# Patient Record
Sex: FEMALE | Race: WHITE | NOT HISPANIC OR LATINO | Employment: OTHER | URBAN - METROPOLITAN AREA
[De-identification: names, ages, dates, MRNs, and addresses within clinical notes are randomized per-mention and may not be internally consistent; named-entity substitution may affect disease eponyms.]

---

## 2023-08-09 ENCOUNTER — TELEPHONE (OUTPATIENT)
Dept: PRIMARY CARE | Facility: CLINIC | Age: 83
End: 2023-08-09
Payer: MEDICARE

## 2023-08-09 NOTE — TELEPHONE ENCOUNTER
The last time Dr. De La Torre saw pt was 10/3/2022. I faxed that last office note to th fax number below.

## 2023-08-09 NOTE — TELEPHONE ENCOUNTER
I had faxed pt's last office note to [facility] atten: Mary Celis per pt family request. I received a call from Mary Celis (ph: [phone]) who works at [facility]. She did not know why we faxed that paperwork to her. Pt is not there and she has not been accepted by them. Mary did not know who this pt is.

## 2023-08-09 NOTE — TELEPHONE ENCOUNTER
Pt daughter called and stated they where never able to get Myah into a nursing home but now are looking to bring her to [facility]. They are requesting medical records be sent to F: [phone] Attn: Mary Celis. Please advise.

## 2023-10-10 ENCOUNTER — OFFICE VISIT (OUTPATIENT)
Dept: PRIMARY CARE | Facility: CLINIC | Age: 83
End: 2023-10-10
Payer: MEDICARE

## 2023-10-10 VITALS
OXYGEN SATURATION: 96 % | RESPIRATION RATE: 20 BRPM | TEMPERATURE: 98 F | HEART RATE: 86 BPM | BODY MASS INDEX: 22.45 KG/M2 | WEIGHT: 97 LBS | HEIGHT: 55 IN | DIASTOLIC BLOOD PRESSURE: 62 MMHG | SYSTOLIC BLOOD PRESSURE: 98 MMHG

## 2023-10-10 DIAGNOSIS — Z23 NEED FOR VACCINATION: ICD-10-CM

## 2023-10-10 DIAGNOSIS — F03.918 DEMENTIA WITH BEHAVIORAL DISTURBANCE (MULTI): ICD-10-CM

## 2023-10-10 PROBLEM — R03.0 ELEVATED BP WITHOUT DIAGNOSIS OF HYPERTENSION: Status: ACTIVE | Noted: 2023-10-10

## 2023-10-10 PROBLEM — R79.89 ABNORMAL TSH: Status: ACTIVE | Noted: 2023-10-10

## 2023-10-10 PROBLEM — E53.8 B12 DEFICIENCY: Status: ACTIVE | Noted: 2023-10-10

## 2023-10-10 PROBLEM — E78.2 MIXED HYPERLIPIDEMIA: Status: ACTIVE | Noted: 2023-10-10

## 2023-10-10 PROBLEM — M85.80 OSTEOPENIA: Status: ACTIVE | Noted: 2023-10-10

## 2023-10-10 PROBLEM — E78.5 HYPERLIPIDEMIA: Status: ACTIVE | Noted: 2023-10-10

## 2023-10-10 PROBLEM — R63.4 WEIGHT LOSS, ABNORMAL: Status: ACTIVE | Noted: 2023-10-10

## 2023-10-10 PROBLEM — K57.32 DIVERTICULITIS OF COLON: Status: ACTIVE | Noted: 2023-10-10

## 2023-10-10 PROBLEM — R41.3 MEMORY PROBLEM: Status: ACTIVE | Noted: 2023-10-10

## 2023-10-10 PROBLEM — R03.0 ELEVATED BP WITHOUT DIAGNOSIS OF HYPERTENSION: Status: RESOLVED | Noted: 2023-10-10 | Resolved: 2023-10-10

## 2023-10-10 PROCEDURE — 1159F MED LIST DOCD IN RCRD: CPT | Performed by: FAMILY MEDICINE

## 2023-10-10 PROCEDURE — 99214 OFFICE O/P EST MOD 30 MIN: CPT | Performed by: FAMILY MEDICINE

## 2023-10-10 PROCEDURE — 1160F RVW MEDS BY RX/DR IN RCRD: CPT | Performed by: FAMILY MEDICINE

## 2023-10-10 PROCEDURE — G0008 ADMIN INFLUENZA VIRUS VAC: HCPCS | Performed by: FAMILY MEDICINE

## 2023-10-10 PROCEDURE — 1036F TOBACCO NON-USER: CPT | Performed by: FAMILY MEDICINE

## 2023-10-10 PROCEDURE — 90662 IIV NO PRSV INCREASED AG IM: CPT | Performed by: FAMILY MEDICINE

## 2023-10-10 RX ORDER — CHOLECALCIFEROL (VITAMIN D3) 25 MCG
1 TABLET,CHEWABLE ORAL DAILY
COMMUNITY
Start: 2022-10-03

## 2023-10-10 RX ORDER — ASPIRIN 81 MG/1
81 TABLET ORAL DAILY
COMMUNITY

## 2023-10-10 RX ORDER — DIPHENHYDRAMINE HCL 25 MG
25 TABLET ORAL NIGHTLY PRN
COMMUNITY

## 2023-10-10 ASSESSMENT — ENCOUNTER SYMPTOMS
OCCASIONAL FEELINGS OF UNSTEADINESS: 1
DEPRESSION: 0
LOSS OF SENSATION IN FEET: 0

## 2023-10-10 NOTE — PROGRESS NOTES
"Myah Franco is a 82 y.o. female here today for   Chief Complaint   Patient presents with    Medical Advice/Question   Pt is moving into nursing home, hoping for [facility] as soon as bed is available     HPI   [facility] needs forms completed soon.  Has still been living at home with her daughter and .  They were looking at putting her in Alzheimer unit last year but they never did.  [facility] has an Alzheimer's unit.  Her Alzheimers has worsened over time with increased confusion.  Poor oral intake - weight stable since last year.  She has not had any falls.  She needs constant supervision from her daughter or her  to make sure that she is safe.  It has become too much for them to continue care.  The family has decided to put her in [facility].    PMH:  Alzheimer's dementia, B12 deficiency, insomnia - Benadryl helps so she does not wander at night.  Incontinent of urine and stool - adult diaper.  No special diet.      Surg Hx:  Hysterectomy, right ankle surgery.    Soc Hx:  No tobacco, No ETOH.  7 children.  .          Current Outpatient Medications:     aspirin 81 mg EC tablet, Take 1 tablet (81 mg) by mouth once daily., Disp: , Rfl:     cyanocobalamin, vitamin B-12, 1,000 mcg capsule, Take 1 capsule by mouth once daily., Disp: , Rfl:     diphenhydrAMINE (Sominex) 25 mg tablet, Take 1 tablet (25 mg) by mouth as needed at bedtime for sleep., Disp: , Rfl:     Patient Active Problem List   Diagnosis    Abnormal TSH    B12 deficiency    Dementia with behavioral disturbance (CMS/HCC)    Diverticulitis of colon    Mixed hyperlipidemia    Memory problem    Osteopenia    Weight loss, abnormal         No results found for this or any previous visit (from the past 672 hour(s)).     Objective    Visit Vitals  BP 98/62   Pulse 86   Temp 36.7 °C (98 °F)   Resp 20   Ht 1.321 m (4' 4\")   Wt (!) 44 kg (97 lb)   SpO2 96%   BMI 25.22 kg/m²     Body mass index is 25.22 kg/m².     Physical Exam   General " - Not in acute distress and cooperative.  Build & Nutrition - Well developed  Posture -kyphotic.  Gait - Normal  Mental Status - alert but not oriented to person place or time.    Head - Normocephalic    Neck - Thyroid normal size    Eyes - Bilateral - Sclera clear and lids pink without edema or mass.      Skin - Warm and dry with no rashes on visible skin    Lungs - Clear to auscultation and normal breathing effort    Cardiovascular - RRR and no murmurs, rubs or thrill.    Peripheral Vascular - Bilateral - no edema present    Neuropsychiatric - normal mood and affect        Assessment    1. Need for vaccination  Flu vaccine, quadrivalent, high-dose, preservative free, age 65y+ (FLUZONE)      2. Dementia with behavioral disturbance (CMS/HCC)     The family has decided to place her in a dementia unit and I think this is a very reasonable decision because of her worsening dementia and need for constant monitoring and supervision.  Her daughter will get the physical forms and bring them to the office so I can complete them and send them to [facility].  The patient is not on any chronic medications and has no other specific medical needs.  She will receive a flu shot today.  I plan to turn over her care to the house physician at her new extended care facility and I discussed this with her daughter to which she agrees.

## 2023-11-28 ENCOUNTER — LAB REQUISITION (OUTPATIENT)
Dept: LAB | Facility: HOSPITAL | Age: 83
End: 2023-11-28
Payer: MEDICARE

## 2023-11-28 DIAGNOSIS — R41.82 ALTERED MENTAL STATUS, UNSPECIFIED: ICD-10-CM

## 2023-11-28 LAB
BASOPHILS # BLD AUTO: 0.02 X10*3/UL (ref 0–0.1)
BASOPHILS NFR BLD AUTO: 0.3 %
EOSINOPHIL # BLD AUTO: 0.04 X10*3/UL (ref 0–0.4)
EOSINOPHIL NFR BLD AUTO: 0.7 %
ERYTHROCYTE [DISTWIDTH] IN BLOOD BY AUTOMATED COUNT: 12.7 % (ref 11.5–14.5)
HCT VFR BLD AUTO: 42.9 % (ref 36–46)
HGB BLD-MCNC: 13.8 G/DL (ref 12–16)
IMM GRANULOCYTES # BLD AUTO: 0.04 X10*3/UL (ref 0–0.5)
IMM GRANULOCYTES NFR BLD AUTO: 0.7 % (ref 0–0.9)
LYMPHOCYTES # BLD AUTO: 0.86 X10*3/UL (ref 0.8–3)
LYMPHOCYTES NFR BLD AUTO: 14.6 %
MCH RBC QN AUTO: 30.6 PG (ref 26–34)
MCHC RBC AUTO-ENTMCNC: 32.2 G/DL (ref 32–36)
MCV RBC AUTO: 95 FL (ref 80–100)
MONOCYTES # BLD AUTO: 0.35 X10*3/UL (ref 0.05–0.8)
MONOCYTES NFR BLD AUTO: 5.9 %
NEUTROPHILS # BLD AUTO: 4.59 X10*3/UL (ref 1.6–5.5)
NEUTROPHILS NFR BLD AUTO: 77.8 %
NRBC BLD-RTO: 0 /100 WBCS (ref 0–0)
PLATELET # BLD AUTO: 374 X10*3/UL (ref 150–450)
RBC # BLD AUTO: 4.51 X10*6/UL (ref 4–5.2)
WBC # BLD AUTO: 5.9 X10*3/UL (ref 4.4–11.3)

## 2023-11-28 PROCEDURE — 85025 COMPLETE CBC W/AUTO DIFF WBC: CPT

## 2023-12-01 ENCOUNTER — LAB REQUISITION (OUTPATIENT)
Dept: LAB | Facility: HOSPITAL | Age: 83
End: 2023-12-01
Payer: MEDICARE

## 2023-12-01 DIAGNOSIS — E78.5 HYPERLIPIDEMIA, UNSPECIFIED: ICD-10-CM

## 2023-12-01 DIAGNOSIS — R79.89 OTHER SPECIFIED ABNORMAL FINDINGS OF BLOOD CHEMISTRY: ICD-10-CM

## 2023-12-01 LAB
ANION GAP SERPL CALC-SCNC: 10 MMOL/L (ref 10–20)
BUN SERPL-MCNC: 16 MG/DL (ref 6–23)
CALCIUM SERPL-MCNC: 8.8 MG/DL (ref 8.6–10.3)
CHLORIDE SERPL-SCNC: 100 MMOL/L (ref 98–107)
CO2 SERPL-SCNC: 35 MMOL/L (ref 21–32)
CREAT SERPL-MCNC: 0.43 MG/DL (ref 0.5–1.05)
GFR SERPL CREATININE-BSD FRML MDRD: >90 ML/MIN/1.73M*2
GLUCOSE SERPL-MCNC: 88 MG/DL (ref 74–99)
POTASSIUM SERPL-SCNC: 3.4 MMOL/L (ref 3.5–5.3)
SODIUM SERPL-SCNC: 142 MMOL/L (ref 136–145)

## 2023-12-01 PROCEDURE — 80048 BASIC METABOLIC PNL TOTAL CA: CPT
